# Patient Record
Sex: MALE | Race: WHITE
[De-identification: names, ages, dates, MRNs, and addresses within clinical notes are randomized per-mention and may not be internally consistent; named-entity substitution may affect disease eponyms.]

---

## 2017-09-07 NOTE — XRAY REPORT
THREE-VIEW LEFT SHOULDER:  09/07/2017

 

CLINICAL INDICATION:  Contusion, pain.  

 

FINDINGS:  Internal and external rotational views and a scapular Y view of the left shoulder demonstr
ate osteoarthritis of the glenohumeral joint, with osteophytes.  There is no evidence of acute fractu
re or dislocation.  No radiopaque foreign body is noted in the soft tissues.  Changes of previous car
diac surgery are incidentally noted.  

 

IMPRESSION:  OSTEOARTHRITIS.  NO EVIDENCE OF ACUTE FRACTURE.  

 

 

 

DD:09/07/2017 13:25:19  DT: 09/07/2017 14:16  JOB #: [id number]  EXT JOB #:[id number]

## 2018-10-10 ENCOUNTER — HOSPITAL ENCOUNTER (EMERGENCY)
Dept: HOSPITAL 76 - ED | Age: 83
Discharge: HOME | End: 2018-10-10
Payer: MEDICARE

## 2018-10-10 VITALS — SYSTOLIC BLOOD PRESSURE: 152 MMHG | DIASTOLIC BLOOD PRESSURE: 66 MMHG
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
## 2018-10-10 DIAGNOSIS — I10: ICD-10-CM

## 2018-10-10 DIAGNOSIS — S61.452A: Primary | ICD-10-CM
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
## 2018-10-10 PROCEDURE — 99283 EMERGENCY DEPT VISIT LOW MDM: CPT

## 2018-10-10 PROCEDURE — 73130 X-RAY EXAM OF HAND: CPT

## 2018-10-10 NOTE — ED PHYSICIAN DOCUMENTATION
History of Present Illness





- Stated complaint


Stated Complaint: CAT BITE LF HAND





- Chief complaint


Chief Complaint: General





- History obtained from


History obtained from: Patient





- History of Present Illness


Timing: Other (He was bit by his own cat to the left hand today.  He is 

up-to-date on tetanus.  He has moderate pain.  No other injuries.)





Review of Systems


Constitutional: denies: Fever, Chills


GI: denies: Abdominal Pain, Nausea, Vomiting


: reports: Reviewed and negative





PD PAST MEDICAL HISTORY





- Past Medical History


Cardiovascular: Hypertension





- Present Medications


Home Medications: 


                                Ambulatory Orders











 Medication  Instructions  Recorded  Confirmed


 


Lisinopril 20 mg PO DAILY 12/03/16 12/03/16


 


Simvastatin 40 mg PO DAILY 12/03/16 12/03/16


 


Amox/Clav 875/125 [Augmentin 1 tab PO BID 10 Days #20 tablet 10/10/18 





875/125]   














- Allergies


Allergies/Adverse Reactions: 


                                    Allergies











Allergy/AdvReac Type Severity Reaction Status Date / Time


 


No Known Drug Allergies Allergy   Verified 10/10/18 16:38














- Social History


Does the pt smoke?: No


Smoking Status: Never smoker


Does the pt have substance abuse?: No





PD ED PE NORMAL





- Vitals


Vital signs reviewed: Yes





- General


General: Alert and oriented X 3, No acute distress





- Extremities


Extremities: Other (There is multiple puncture wounds about the left dorsum of 

the hand and palm, good range of motion, some tenderness over the distal 

carpals.)





- Neuro


Neuro: Alert and oriented X 3, Normal speech





Results





- Vitals


Vitals: 


                               Vital Signs - 24 hr











  10/10/18





  16:34


 


Temperature 36.4 C L


 


Heart Rate 83


 


Respiratory 20





Rate 


 


Blood Pressure 161/83 H


 


O2 Saturation 98








                                     Oxygen











O2 Source                      Room air

















- Rads (name of study)


  ** L hand 3v XR


Radiology: EMP read contemporaneously (NAD)





PD MEDICAL DECISION MAKING





- Sepsis Event


Vital Signs: 


                               Vital Signs - 24 hr











  10/10/18





  16:34


 


Temperature 36.4 C L


 


Heart Rate 83


 


Respiratory 20





Rate 


 


Blood Pressure 161/83 H


 


O2 Saturation 98








                                     Oxygen











O2 Source                      Room air

















Departure





- Departure


Disposition: 01 Home, Self Care


Clinical Impression: 


Cat bite of left hand


Qualifiers:


 Encounter type: initial encounter Qualified Code(s): S61.452A - Open bite of 

left hand, initial encounter; W55.01XA - Bitten by cat, initial encounter





Condition: Good


Record reviewed to determine appropriate education?: Yes


Instructions:  ED Bite Cat


Prescriptions: 


Amox/Clav 875/125 [Augmentin 875/125] 1 tab PO BID 10 Days #20 tablet


Comments: 


Return for any signs of infection, redness, swelling, drainage, increased pain.





Your blood pressure was elevated today on check into the emergency department.  

This does not mean that you have hypertension, it is a common phenomenon to come

to the emergency department and have elevated blood pressure.  I recommend that 

you see your primary care physician within the week to have it rechecked when 

you are feeling better.

## 2018-10-10 NOTE — XRAY REPORT
Reason:  hand inj

Procedure Date:  10/10/2018   

Accession Number:  [id number] / [id number]                    

Procedure:  XR  - Hand 3 View LT CPT Code:  

 

FULL RESULT:

 

 

EXAM:

LEFT HAND RADIOGRAPHY

 

EXAM DATE: 10/10/2018 06:09 PM.

 

CLINICAL HISTORY: Hand inj.

 

COMPARISON: None.

 

TECHNIQUE: 3 views.

 

FINDINGS:

Bones: There is heterogeneous density of the carpal bones of the wrist. 

There are multiple periarticular ossifications at the wrist. There is 

spurring of the first interphalangeal joint and multiple PIP and DIP 

joints. No acute fracture is identified.

 

Joints: No dislocation.

 

Soft Tissues: There is generalized soft tissue swelling.

IMPRESSION:

1. Generalized soft tissue swelling.

2. 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

3. No visualized acute fracture.

 

[facility]